# Patient Record
Sex: MALE | Race: OTHER | HISPANIC OR LATINO | Employment: FULL TIME | URBAN - METROPOLITAN AREA
[De-identification: names, ages, dates, MRNs, and addresses within clinical notes are randomized per-mention and may not be internally consistent; named-entity substitution may affect disease eponyms.]

---

## 2019-08-20 ENCOUNTER — HISTORICAL (OUTPATIENT)
Dept: ADMINISTRATIVE | Facility: HOSPITAL | Age: 34
End: 2019-08-20

## 2019-08-20 LAB
ABS NEUT (OLG): 4.3 X10(3)/MCL (ref 2.1–9.2)
ALBUMIN SERPL-MCNC: 4.7 GM/DL (ref 3.4–5)
ALBUMIN/GLOB SERPL: 1.81 {RATIO} (ref 1.5–2.5)
ALP SERPL-CCNC: 80 UNIT/L (ref 38–126)
ALT SERPL-CCNC: 26 UNIT/L (ref 7–52)
APPEARANCE, UA: CLEAR
AST SERPL-CCNC: 18 UNIT/L (ref 15–37)
BACTERIA #/AREA URNS AUTO: NORMAL /HPF
BILIRUB SERPL-MCNC: 0.9 MG/DL (ref 0.2–1)
BILIRUB UR QL STRIP: NEGATIVE MG/DL
BILIRUBIN DIRECT+TOT PNL SERPL-MCNC: 0.2 MG/DL (ref 0–0.5)
BILIRUBIN DIRECT+TOT PNL SERPL-MCNC: 0.7 MG/DL
BUN SERPL-MCNC: 11 MG/DL (ref 7–18)
CALCIUM SERPL-MCNC: 9.1 MG/DL (ref 8.5–10)
CHLORIDE SERPL-SCNC: 104 MMOL/L (ref 98–107)
CHOLEST SERPL-MCNC: 172 MG/DL (ref 0–200)
CHOLEST/HDLC SERPL: 5.1 {RATIO}
CO2 SERPL-SCNC: 34 MMOL/L (ref 21–32)
COLOR UR: YELLOW
CREAT SERPL-MCNC: 0.89 MG/DL (ref 0.6–1.3)
ERYTHROCYTE [DISTWIDTH] IN BLOOD BY AUTOMATED COUNT: 13.1 % (ref 11.5–17)
GLOBULIN SER-MCNC: 2.6 GM/DL (ref 1.2–3)
GLUCOSE (UA): NEGATIVE MG/DL
GLUCOSE SERPL-MCNC: 92 MG/DL (ref 74–106)
HCT VFR BLD AUTO: 45.1 % (ref 42–52)
HDLC SERPL-MCNC: 34 MG/DL (ref 35–60)
HGB BLD-MCNC: 16 GM/DL (ref 14–18)
HGB UR QL STRIP: NEGATIVE UNIT/L
KETONES UR QL STRIP: NEGATIVE MG/DL
LDLC SERPL CALC-MCNC: 118 MG/DL (ref 0–129)
LEUKOCYTE ESTERASE UR QL STRIP: NEGATIVE UNIT/L
LYMPHOCYTES # BLD AUTO: 2.7 X10(3)/MCL (ref 0.6–3.4)
LYMPHOCYTES NFR BLD AUTO: 34.7 % (ref 13–40)
MCH RBC QN AUTO: 29.9 PG (ref 27–31.2)
MCHC RBC AUTO-ENTMCNC: 36 GM/DL (ref 32–36)
MCV RBC AUTO: 84 FL (ref 80–94)
MONOCYTES # BLD AUTO: 0.7 X10(3)/MCL (ref 0.1–1.3)
MONOCYTES NFR BLD AUTO: 9.6 % (ref 0.1–24)
NEUTROPHILS NFR BLD AUTO: 55.7 % (ref 47–80)
NITRITE UR QL STRIP.AUTO: NEGATIVE
PH UR STRIP: 5.5 [PH]
PLATELET # BLD AUTO: 247 X10(3)/MCL (ref 130–400)
PMV BLD AUTO: 8.4 FL (ref 9.4–12.4)
POTASSIUM SERPL-SCNC: 4.2 MMOL/L (ref 3.5–5.1)
PROT SERPL-MCNC: 7.3 GM/DL (ref 6.4–8.2)
PROT UR QL STRIP: NEGATIVE MG/DL
RBC # BLD AUTO: 5.35 X10(6)/MCL (ref 4.7–6.1)
RBC #/AREA URNS HPF: NORMAL /HPF
SODIUM SERPL-SCNC: 141 MMOL/L (ref 136–145)
SP GR UR STRIP: 1.02
SQUAMOUS EPITHELIAL, UA: NORMAL /LPF
TRIGL SERPL-MCNC: 108 MG/DL (ref 30–150)
TSH SERPL-ACNC: 1.01 MIU/ML (ref 0.35–4.94)
UROBILINOGEN UR STRIP-ACNC: 0.2 MG/DL
VLDLC SERPL CALC-MCNC: 21.6 MG/DL
WBC # SPEC AUTO: 7.7 X10(3)/MCL (ref 4.5–11.5)
WBC #/AREA URNS AUTO: NORMAL /[HPF]

## 2020-09-16 ENCOUNTER — HISTORICAL (OUTPATIENT)
Dept: ADMINISTRATIVE | Facility: HOSPITAL | Age: 35
End: 2020-09-16

## 2020-09-16 LAB
ABS NEUT (OLG): 5 X10(3)/MCL (ref 2.1–9.2)
ALBUMIN SERPL-MCNC: 4.6 GM/DL (ref 3.4–5)
ALBUMIN/GLOB SERPL: 1.64 {RATIO} (ref 1.5–2.5)
ALP SERPL-CCNC: 86 UNIT/L (ref 38–126)
ALT SERPL-CCNC: 25 UNIT/L (ref 7–52)
AST SERPL-CCNC: 21 UNIT/L (ref 15–37)
BILIRUB SERPL-MCNC: 0.7 MG/DL (ref 0.2–1)
BILIRUBIN DIRECT+TOT PNL SERPL-MCNC: 0.1 MG/DL (ref 0–0.5)
BILIRUBIN DIRECT+TOT PNL SERPL-MCNC: 0.6 MG/DL
BNP BLD-MCNC: <10 PG/ML
BUN SERPL-MCNC: 13 MG/DL (ref 7–18)
CALCIUM SERPL-MCNC: 9.6 MG/DL (ref 8.5–10.1)
CHLORIDE SERPL-SCNC: 105 MMOL/L (ref 98–107)
CO2 SERPL-SCNC: 25 MMOL/L (ref 21–32)
CREAT SERPL-MCNC: 0.94 MG/DL (ref 0.6–1.3)
D DIMER PPP IA.FEU-MCNC: <0.27 MCG/ML FEU (ref 0–0.5)
ERYTHROCYTE [DISTWIDTH] IN BLOOD BY AUTOMATED COUNT: 12.5 % (ref 11.5–17)
GLOBULIN SER-MCNC: 2.8 GM/DL (ref 1.2–3)
GLUCOSE SERPL-MCNC: 90 MG/DL (ref 74–106)
HCT VFR BLD AUTO: 45.3 % (ref 42–52)
HGB BLD-MCNC: 15.8 GM/DL (ref 14–18)
LYMPHOCYTES # BLD AUTO: 2.5 X10(3)/MCL (ref 0.6–3.4)
LYMPHOCYTES NFR BLD AUTO: 29.1 % (ref 13–40)
MCH RBC QN AUTO: 29.8 PG (ref 27–31.2)
MCHC RBC AUTO-ENTMCNC: 35 GM/DL (ref 32–36)
MCV RBC AUTO: 86 FL (ref 80–94)
MONOCYTES # BLD AUTO: 1.1 X10(3)/MCL (ref 0.1–1.3)
MONOCYTES NFR BLD AUTO: 12.3 % (ref 0.1–24)
NEUTROPHILS NFR BLD AUTO: 58.6 % (ref 47–80)
PLATELET # BLD AUTO: 265 X10(3)/MCL (ref 130–400)
PMV BLD AUTO: 8.4 FL (ref 9.4–12.4)
POTASSIUM SERPL-SCNC: 3.5 MMOL/L (ref 3.5–5.1)
PROT SERPL-MCNC: 7.4 GM/DL (ref 6.4–8.2)
RBC # BLD AUTO: 5.3 X10(6)/MCL (ref 4.7–6.1)
SODIUM SERPL-SCNC: 142 MMOL/L (ref 136–145)
WBC # SPEC AUTO: 8.6 X10(3)/MCL (ref 4.5–11.5)

## 2021-09-15 ENCOUNTER — HISTORICAL (OUTPATIENT)
Dept: PREADMISSION TESTING | Facility: HOSPITAL | Age: 36
End: 2021-09-15

## 2021-09-15 LAB — SARS-COV-2 AG RESP QL IA.RAPID: NEGATIVE

## 2021-09-16 ENCOUNTER — HISTORICAL (OUTPATIENT)
Dept: ADMINISTRATIVE | Facility: HOSPITAL | Age: 36
End: 2021-09-16

## 2022-04-09 ENCOUNTER — HISTORICAL (OUTPATIENT)
Dept: ADMINISTRATIVE | Facility: HOSPITAL | Age: 37
End: 2022-04-09

## 2022-04-29 VITALS
BODY MASS INDEX: 43.21 KG/M2 | DIASTOLIC BLOOD PRESSURE: 78 MMHG | WEIGHT: 301.81 LBS | SYSTOLIC BLOOD PRESSURE: 146 MMHG | BODY MASS INDEX: 42.92 KG/M2 | DIASTOLIC BLOOD PRESSURE: 82 MMHG | HEIGHT: 70 IN | HEIGHT: 70 IN | SYSTOLIC BLOOD PRESSURE: 130 MMHG | WEIGHT: 299.81 LBS

## 2022-04-30 NOTE — OP NOTE
Patient:   David Russell             MRN: [id number]            FIN: [id number]               Age:   36 years     Sex:  Male     :  1985   Associated Diagnoses:   None   Author:   Toby Conley MD            DATE OF SURGERY:   21    SURGEON:  Toby Conley MD    PREOPERATIVE DIAGNOSIS:  Acute Cholecystitis    POST OPERATIVE DIAGNOSIS:  Acute on Gangrenous Cholecystitis    PROCEDURE:  Laparoscopic cholecystectomy.    ANESTHESIA:  General endotracheal anesthesia.    ESTIMATED BLOOD LOSS:  Approximately 20 cc.   Blood administered, none.    LAP AND INSTRUMENT COUNT:  Correct X2 at the end of the case.    SPECIMENS:  Gallbladder.    INDICATION AND SIGNIFICANT HISTORY:  Patient is a 36-year-old male complaining of post prandial right upper quadrant pain associated with fatty meals.  Patient was worked up clinically and found to have acute cholecystitis.  Risks and benefits of surgery was discussed with the patient who voiced understanding of risks / benefits and elected to proceed with surgery.        PROCEDURE IN DETAIL:  Once informed consents were obtained, patient was taken to the operating room and placed supine on the operating table.  After general endotracheal anesthesia was induced, the abdomen was prepped and draped in the standard sterile surgical fashion.  Periumbilical incision was made with the scalpel and the Veress needle was used to enter the abdominal cavity.  Pneumoperitoneum was then created with insufflation.  After adequate insufflation was obtained, 11 millimeter trocar port were placed through this wound.  Two additional 5 millimeter ports were placed in the right upper quadrant followed by 5 millimeter trocar placed subxiphoid.  The gallbladder was then visualized appeared to have acute and chronic inflammatory changes along with gangrene and retracted both superiorly and laterally to achieve the critical view.  Dissection was carried out in lateral to medial  fashion.  The cystic duct were first visualized followed by cystic artery appropriate.  Two clips were placed twice proximally on each structure and one distally and then transected.  The gallbladder was then removed from the liver bed using the hook cautery and passed off the table as specimen through the periumbilical trocar port site.  Attention was then redirected to the gallbladder fossa, no active bleeding was noted.  Good hemostasis was visualized.  All ports were then removed under direct visualization with no active bleeding noted.  Skin was then closed with 4-0 Vicryl suture in interrupted fashion.  Skin was  cleaned and dry sterile dressing was placed on the wound.  Lap and instrument  counts were correct X2 at the end of the case.  Patient tolerated the procedure well and was transferred to recovery room in good condition.

## 2022-05-02 NOTE — HISTORICAL [facility] CERNER
This is a historical note converted from Sherri. Formatting and pictures may have been removed.  Please reference Sherri for original formatting and attached multimedia. Chief Complaint  SOB X 4 WEEKS INTERMITTENT, USUALLY WHEN EXERTED.  History of Present Illness  Intermittent SOB off and on over the last 2 weeks.? Seems to start when at work and exerting self.? + Hx sleep apnea. no cough. denies anxiety.? No symptoms at night.? He does have chronic nasal congestion L>R but seems to be more over past few weeks.? No palpitations.? +8lb weight gain since last year.? Drinks 3-4 sodas per day. No hx asthma.? Taking his blood pressure med daily but unsure if controlled at home.  Review of Systems  Genera:l 8 lb weight gain,?no?fever, no periods of immobilization recently?  HEENT: Left nostril chronically stuffy, wears CPAP nightly  respiratory: no hx asthma, no cough or wheezing  Cardiac: No chest pain, +SOLIS, no palpitations, no orthopnea  GI: N/C  : N/C  musculoskeletal : B knee pain since working on knees more lately, no leg edema or calf pain  ?  Physical Exam  Vitals & Measurements  T:?37.0? ?C (Oral)? HR:?62(Peripheral)? BP:?146/78?  HT:?177.80?cm? WT:?136.000?kg? BMI:?43.02?  General: NAD, no Respiratory distress  HEENT: + edematous turbinates B, narrow oropharyngeal passage  Chest: CTA B  Cardiac:? regularly irregular rhythm, no M  ABD: soft, obese, NT/ND  EXT: no edema, neg homans  Assessment/Plan  1.?SOB (shortness of breath)?R06.02  ?CBC-, CMP,D-Dimer, BNP, ?CXR-neg ,? EKG-- NSR couple PVC,? Consider referral to cardiology pending labs and recheck in 2 weeks, encourage weight loss, d/c sodas  Ordered:  B-Type Natriuretic Peptide, Routine collect, 09/16/20 15:17:00 CDT, Blood, Order for future visit, Stop date 09/16/20 15:17:00 CDT, Lab Collect, SOB (shortness of breath), 09/16/20 15:17:00 CDT  Clinic Follow up, *Est. 09/30/20 3:00:00 CDT, Order for future visit, SOB (shortness of breath)  Chronic nasal  congestion  Hypertension, [facility]  Comprehensive Metabolic Panel, Routine collect, 09/16/20 15:26:00 CDT, Blood, Stop date 09/16/20 15:26:00 CDT, Lab Collect, SOB (shortness of breath), 09/16/20 15:26:00 CDT  D-Dimer, Routine collect, 09/16/20 15:17:00 CDT, Blood, Order for future visit, Stop date 09/16/20 15:17:00 CDT, Lab Collect, SOB (shortness of breath), 09/16/20 15:17:00 CDT  Office/Outpatient Visit Level 4 Established [id number] PC, SOB (shortness of breath)  Chronic nasal congestion  Hypertension, [facility] - [facility], 09/16/20 15:42:00 CDT  XR Chest 2 Views, Routine, 09/16/20 15:04:00 CDT, Other (please specify), None, Ambulatory, Rad Type, SOB (shortness of breath), [facility], 09/16/20 15:04:00 CDT  ?  2.?Chronic nasal congestion?R09.81  ?Begin flonase 2 sprays q nostril q d.  Ordered:  Clinic Follow up, *Est. 09/30/20 3:00:00 CDT, Order for future visit, SOB (shortness of breath)  Chronic nasal congestion  Hypertension, [facility]  Office/Outpatient Visit Level 4 Established [id number] PC, SOB (shortness of breath)  Chronic nasal congestion  Hypertension, [facility], 09/16/20 15:42:00 CDT  ?  3.?Hypertension?I10  ?keep BP daily record and bring to ov in 2 weeks  Ordered:  Clinic Follow up, *Est. 09/30/20 3:00:00 CDT, Order for future visit, SOB (shortness of breath)  Chronic nasal congestion  Hypertension, [facility]  Office/Outpatient Visit Level 4 Established [id number] PC, SOB (shortness of breath)  Chronic nasal congestion  Hypertension, [facility], 09/16/20 15:42:00 CDT  ?  Referrals  Clinic Follow up, *Est. 09/30/20 14:30:00 CDT, Order for future visit, Chronic nasal congestion, [facility]   Problem List/Past Medical History  Ongoing  Hypertension  Sleep apnea  Wellness examination  Historical  HTN - Hypertension  Obstructive sleep apnea of adult  Procedure/Surgical History  HERNIA REPAIR LEFT INGUINAL (2018)   Medications  amlodipine 10 mg oral tablet, See Instructions, 9  refills  Allergies  No Known Medication Allergies  Social History  Abuse/Neglect  No, 09/16/2020  No, 08/19/2019  Alcohol  Current, Beer, 1-2 times per month, 08/19/2019  Current, 03/16/2018  Employment/School  Employed, Work/School description: [employer] 08/19/2019  Previous employment/school: [employer]., 03/16/2018  Home/Environment  Lives with Spouse., 03/16/2018  Tobacco  Never (less than 100 in lifetime), N/A, 09/16/2020  Never (less than 100 in lifetime), N/A, 08/19/2019  Never (less than 100 in lifetime), N/A, 08/19/2019  Never smoker, N/A, 11/16/2018  Never smoker, 03/16/2018  Family History  Diabetes mellitus: Father.  Gallbladder: Mother.  Hypertension.: Father.  Hypothyroidism.: Sister.  Immunizations  Vaccine Date Status   varicella virus vaccine 11/15/2018 Given   influenza virus vaccine, inactivated 11/15/2018 Given   measles/mumps/rubella virus vaccine 11/15/2018 Given   varicella virus vaccine 06/07/2017 Recorded   measles/mumps/rubella virus vaccine 06/07/2017 Recorded   tetanus/diphtheria/pertussis, acel(Tdap) 06/07/2017 Recorded   Health Maintenance  Health Maintenance  ???Pending?(in the next year)  ??? ??OverDue  ??? ? ? ?Alcohol Misuse Screening due??01/02/20??and every 1??year(s)  ??? ? ? ?Hypertension Management-BMP due??08/19/20??and every 1??year(s)  ??? ??Due?  ??? ? ? ?ADL Screening due??09/16/20??and every 1??year(s)  ??? ? ? ?Depression Screening due??09/16/20??and every?  ??? ? ? ?Hypertension Management-Education due??09/16/20??and every 1??year(s)  ??? ? ? ?Influenza Vaccine due??09/16/20??and every?  ??? ??Due In Future?  ??? ? ? ?Obesity Screening not due until??01/01/21??and every 1??year(s)  ???Satisfied?(in the past 1 year)  ??? ??Satisfied?  ??? ? ? ?Blood Pressure Screening on??09/16/20.??Satisfied by Emma Bocanegra CMA  ??? ? ? ?Body Mass Index Check on??09/16/20.??Satisfied by Emma Bocanegra CMA  ??? ? ? ?Hypertension  Management-Blood Pressure on??09/16/20.??Satisfied by Emma Bocanegra CMA  ??? ? ? ?Obesity Screening on??09/16/20.??Satisfied by Emma Bocanegra CMA  ?

## 2022-10-25 ENCOUNTER — LAB VISIT (OUTPATIENT)
Dept: LAB | Facility: HOSPITAL | Age: 37
End: 2022-10-25
Attending: SURGERY
Payer: COMMERCIAL

## 2022-10-25 DIAGNOSIS — Z01.818 PREOPERATIVE EXAMINATION, UNSPECIFIED: Primary | ICD-10-CM

## 2022-10-25 LAB
ANION GAP SERPL CALC-SCNC: 8 MEQ/L
BASOPHILS # BLD AUTO: 0.05 X10(3)/MCL (ref 0–0.2)
BASOPHILS NFR BLD AUTO: 0.7 %
BUN SERPL-MCNC: 15.4 MG/DL (ref 8.9–20.6)
CALCIUM SERPL-MCNC: 9.8 MG/DL (ref 8.4–10.2)
CHLORIDE SERPL-SCNC: 109 MMOL/L (ref 98–107)
CO2 SERPL-SCNC: 26 MMOL/L (ref 22–29)
CREAT SERPL-MCNC: 1.09 MG/DL (ref 0.73–1.18)
CREAT/UREA NIT SERPL: 14
EOSINOPHIL # BLD AUTO: 0.18 X10(3)/MCL (ref 0–0.9)
EOSINOPHIL NFR BLD AUTO: 2.6 %
ERYTHROCYTE [DISTWIDTH] IN BLOOD BY AUTOMATED COUNT: 13.1 % (ref 11.5–17)
GFR SERPLBLD CREATININE-BSD FMLA CKD-EPI: >60 MLS/MIN/1.73/M2
GLUCOSE SERPL-MCNC: 96 MG/DL (ref 74–100)
HCT VFR BLD AUTO: 47.1 % (ref 42–52)
HGB BLD-MCNC: 15.8 GM/DL (ref 14–18)
IMM GRANULOCYTES # BLD AUTO: 0.03 X10(3)/MCL (ref 0–0.04)
IMM GRANULOCYTES NFR BLD AUTO: 0.4 %
LYMPHOCYTES # BLD AUTO: 1.86 X10(3)/MCL (ref 0.6–4.6)
LYMPHOCYTES NFR BLD AUTO: 26.8 %
MCH RBC QN AUTO: 29.7 PG (ref 27–31)
MCHC RBC AUTO-ENTMCNC: 33.5 MG/DL (ref 33–36)
MCV RBC AUTO: 88.5 FL (ref 80–94)
MONOCYTES # BLD AUTO: 0.56 X10(3)/MCL (ref 0.1–1.3)
MONOCYTES NFR BLD AUTO: 8.1 %
NEUTROPHILS # BLD AUTO: 4.3 X10(3)/MCL (ref 2.1–9.2)
NEUTROPHILS NFR BLD AUTO: 61.4 %
NRBC BLD AUTO-RTO: 0 %
PLATELET # BLD AUTO: 237 X10(3)/MCL (ref 130–400)
PMV BLD AUTO: 9.4 FL (ref 7.4–10.4)
POTASSIUM SERPL-SCNC: 3.9 MMOL/L (ref 3.5–5.1)
RBC # BLD AUTO: 5.32 X10(6)/MCL (ref 4.7–6.1)
SARS-COV-2 RNA RESP QL NAA+PROBE: NOT DETECTED
SODIUM SERPL-SCNC: 143 MMOL/L (ref 136–145)
WBC # SPEC AUTO: 6.9 X10(3)/MCL (ref 4.5–11.5)

## 2022-10-25 PROCEDURE — 80048 BASIC METABOLIC PNL TOTAL CA: CPT

## 2022-10-25 PROCEDURE — 85025 COMPLETE CBC W/AUTO DIFF WBC: CPT

## 2022-10-25 PROCEDURE — 36415 COLL VENOUS BLD VENIPUNCTURE: CPT

## 2022-10-25 PROCEDURE — 87635 SARS-COV-2 COVID-19 AMP PRB: CPT

## 2022-10-27 PROBLEM — K43.9 VENTRAL HERNIA: Status: ACTIVE | Noted: 2022-10-27

## 2023-01-09 PROBLEM — I10 HYPERTENSION: Status: ACTIVE | Noted: 2023-01-09

## 2023-01-09 PROBLEM — Z00.00 WELLNESS EXAMINATION: Status: ACTIVE | Noted: 2023-01-09

## 2023-01-09 PROBLEM — G47.33 OSA (OBSTRUCTIVE SLEEP APNEA): Status: ACTIVE | Noted: 2023-01-09

## 2023-01-16 ENCOUNTER — OFFICE VISIT (OUTPATIENT)
Dept: URGENT CARE | Facility: CLINIC | Age: 38
End: 2023-01-16
Payer: COMMERCIAL

## 2023-01-16 VITALS
SYSTOLIC BLOOD PRESSURE: 150 MMHG | TEMPERATURE: 99 F | HEIGHT: 70 IN | RESPIRATION RATE: 17 BRPM | HEART RATE: 90 BPM | BODY MASS INDEX: 41.52 KG/M2 | OXYGEN SATURATION: 98 % | WEIGHT: 290 LBS | DIASTOLIC BLOOD PRESSURE: 104 MMHG

## 2023-01-16 DIAGNOSIS — U07.1 COVID-19 VIRUS DETECTED: ICD-10-CM

## 2023-01-16 DIAGNOSIS — U07.1 COVID-19: Primary | ICD-10-CM

## 2023-01-16 DIAGNOSIS — R05.9 COUGH, UNSPECIFIED TYPE: ICD-10-CM

## 2023-01-16 LAB
CTP QC/QA: YES
MOLECULAR STREP A: NEGATIVE
POC MOLECULAR INFLUENZA A AGN: NEGATIVE
POC MOLECULAR INFLUENZA B AGN: NEGATIVE
SARS-COV-2 RDRP RESP QL NAA+PROBE: POSITIVE

## 2023-01-16 PROCEDURE — 3077F PR MOST RECENT SYSTOLIC BLOOD PRESSURE >= 140 MM HG: ICD-10-PCS | Mod: CPTII,,, | Performed by: FAMILY MEDICINE

## 2023-01-16 PROCEDURE — 3077F SYST BP >= 140 MM HG: CPT | Mod: CPTII,,, | Performed by: FAMILY MEDICINE

## 2023-01-16 PROCEDURE — 87502 INFLUENZA DNA AMP PROBE: CPT | Mod: QW,,, | Performed by: FAMILY MEDICINE

## 2023-01-16 PROCEDURE — 3008F BODY MASS INDEX DOCD: CPT | Mod: CPTII,,, | Performed by: FAMILY MEDICINE

## 2023-01-16 PROCEDURE — 3008F PR BODY MASS INDEX (BMI) DOCUMENTED: ICD-10-PCS | Mod: CPTII,,, | Performed by: FAMILY MEDICINE

## 2023-01-16 PROCEDURE — 3080F PR MOST RECENT DIASTOLIC BLOOD PRESSURE >= 90 MM HG: ICD-10-PCS | Mod: CPTII,,, | Performed by: FAMILY MEDICINE

## 2023-01-16 PROCEDURE — 1159F MED LIST DOCD IN RCRD: CPT | Mod: CPTII,,, | Performed by: FAMILY MEDICINE

## 2023-01-16 PROCEDURE — 3080F DIAST BP >= 90 MM HG: CPT | Mod: CPTII,,, | Performed by: FAMILY MEDICINE

## 2023-01-16 PROCEDURE — 1160F RVW MEDS BY RX/DR IN RCRD: CPT | Mod: CPTII,,, | Performed by: FAMILY MEDICINE

## 2023-01-16 PROCEDURE — 1159F PR MEDICATION LIST DOCUMENTED IN MEDICAL RECORD: ICD-10-PCS | Mod: CPTII,,, | Performed by: FAMILY MEDICINE

## 2023-01-16 PROCEDURE — 99213 PR OFFICE/OUTPT VISIT, EST, LEVL III, 20-29 MIN: ICD-10-PCS | Mod: ,,, | Performed by: FAMILY MEDICINE

## 2023-01-16 PROCEDURE — 87635 SARS-COV-2 COVID-19 AMP PRB: CPT | Mod: QW,,, | Performed by: FAMILY MEDICINE

## 2023-01-16 PROCEDURE — 1160F PR REVIEW ALL MEDS BY PRESCRIBER/CLIN PHARMACIST DOCUMENTED: ICD-10-PCS | Mod: CPTII,,, | Performed by: FAMILY MEDICINE

## 2023-01-16 PROCEDURE — 99213 OFFICE O/P EST LOW 20 MIN: CPT | Mod: ,,, | Performed by: FAMILY MEDICINE

## 2023-01-16 PROCEDURE — 87651 POCT STREP A MOLECULAR: ICD-10-PCS | Mod: QW,,, | Performed by: FAMILY MEDICINE

## 2023-01-16 PROCEDURE — 87502 POCT INFLUENZA A/B MOLECULAR: ICD-10-PCS | Mod: QW,,, | Performed by: FAMILY MEDICINE

## 2023-01-16 PROCEDURE — 87635: ICD-10-PCS | Mod: QW,,, | Performed by: FAMILY MEDICINE

## 2023-01-16 PROCEDURE — 87651 STREP A DNA AMP PROBE: CPT | Mod: QW,,, | Performed by: FAMILY MEDICINE

## 2023-01-16 NOTE — PATIENT INSTRUCTIONS
With Concern for COVID-19 infection. Swabs obtained today. COVID-19 Test positive  Adequate hydration and rest. Monitor the symptoms closely  Recommendation is to follow a timeline quarantine measures per CDC and L[facility]  Tylenol as needed for fever, body aches and headache. Antihistamine of choice for congestion.   Coricidin HBP for cough and cold as needed and as directed. Can try vitamin C, zinc 50 mg, vitamin D3 5000 IU for 10 days.  At home quarantine instructions for 7-10 days since start of symptoms, no fever (100.4 and above) for 24 hours and with improved symptoms can stop home quarantine  Wear a mask, cover your cough and sneeze. Clean any shared surfaces  Call or return to clinic for any questions. ER precautions with any acute change in symptoms.   Patient's with partners of childbearing potential to use a reliable method of contraception during the therapy and for 3 months after the last dose of antiviral medication  Flu test negative, strep test negative.  Work excuse 3 days

## 2023-01-16 NOTE — PROGRESS NOTES
"Subjective:       Patient ID: David King is a 37 y.o. male.    Vitals:  height is 5' 10" (1.778 m) and weight is 131.5 kg (290 lb). His temperature is 99.1 °F (37.3 °C). His blood pressure is 150/104 (abnormal) and his pulse is 90. His respiration is 17 and oxygen saturation is 98%.     Chief Complaint: Sinus Problem (Cough, sinus pressure, stuffy nose, sore throat since Friday. Tried taking tylenol/ mucinex/ benadryl)    HPI:  37-year-old male present to clinic with concerns of coughing, congestion, stuffy nose and sore throat since 4.  Over-the-counter medication as listed above some help.  Blood pressure elevated in the clinic.  History of hypertension, currently on medication.  Caution with cough and cold medicine over-the-counter.  No concerns of positive exposure to infections.  Not vaccinated for COVID-19.  No recent travel    ROS  :  Constitutional : _ fever , Body aches, Chills  HENT_sore throat, postnasal drainage  Respiratory_no wheezing, no shortness of breath  Cardiovascular_no chest pain  Gastrointestinal_ No vomiting, No diarrhea, No abdominal pain  Musculoskeletal_no joint pain, no joint swelling  Integumentary_no skin rash     Objective:      Physical Exam    General : Alert and Oriented, No apparent distress, afebrile, sounds stuffy congested  Neck - supple  HENT : Oropharynx and tonsils no redness or swelling   Respiratory : Bilateral equal breath sounds, nonlabored respirations  Cardiovascular : Rate, rhythm regular, normal volume pulse, no murmur  Gastrointestinal: Full abdomen, soft, nontender to palpate  Integumentary : Warm, Dry and no rash    Assessment:       1. COVID-19    2. Cough, unspecified type       Plan:     With Concern for COVID-19 infection. Swabs obtained today. COVID-19 Test positive  Adequate hydration and rest. Monitor the symptoms closely  Recommendation is to follow a timeline quarantine measures per CDC and LDH  Tylenol as needed for fever, body aches and " headache. Antihistamine of choice for congestion.   Coricidin HBP for cough and cold as needed and as directed. Can try vitamin C, zinc 50 mg, vitamin D3 5000 IU for 10 days.  At home quarantine instructions for 7-10 days since start of symptoms, no fever (100.4 and above) for 24 hours and with improved symptoms can stop home quarantine  Wear a mask, cover your cough and sneeze. Clean any shared surfaces  Call or return to clinic for any questions. ER precautions with any acute change in symptoms.   Patient's with partners of childbearing potential to use a reliable method of contraception during the therapy and for 3 months after the last dose of antiviral medication  Flu test negative, strep test negative.  Work excuse 3 days  COVID-19    Cough, unspecified type  -     POCT COVID-19 Rapid Screening  -     POCT Influenza A/B Molecular  -     POCT Strep A, Molecular

## 2023-04-17 PROBLEM — Z00.00 WELLNESS EXAMINATION: Status: RESOLVED | Noted: 2023-01-09 | Resolved: 2023-04-17

## 2023-08-29 PROBLEM — E11.9 TYPE 2 DIABETES MELLITUS WITHOUT COMPLICATION, WITHOUT LONG-TERM CURRENT USE OF INSULIN: Status: ACTIVE | Noted: 2023-08-29

## 2023-08-30 PROBLEM — E11.9 TYPE 2 DIABETES MELLITUS WITHOUT COMPLICATION, WITHOUT LONG-TERM CURRENT USE OF INSULIN: Status: RESOLVED | Noted: 2023-08-29 | Resolved: 2023-08-30

## 2023-08-30 PROBLEM — E11.65 UNCONTROLLED DIABETES MELLITUS WITH HYPERGLYCEMIA: Status: ACTIVE | Noted: 2023-08-30

## 2023-09-27 PROBLEM — E66.01 MORBID (SEVERE) OBESITY DUE TO EXCESS CALORIES: Status: ACTIVE | Noted: 2023-09-27

## 2023-12-07 PROBLEM — E88.810 METABOLIC SYNDROME: Status: ACTIVE | Noted: 2023-12-07

## 2024-02-29 PROBLEM — Z23 IMMUNIZATION DUE: Status: ACTIVE | Noted: 2024-02-29
